# Patient Record
Sex: MALE | Race: BLACK OR AFRICAN AMERICAN | NOT HISPANIC OR LATINO | Employment: FULL TIME | URBAN - METROPOLITAN AREA
[De-identification: names, ages, dates, MRNs, and addresses within clinical notes are randomized per-mention and may not be internally consistent; named-entity substitution may affect disease eponyms.]

---

## 2024-06-20 ENCOUNTER — HOSPITAL ENCOUNTER (EMERGENCY)
Facility: HOSPITAL | Age: 39
Discharge: HOME | End: 2024-06-20
Attending: STUDENT IN AN ORGANIZED HEALTH CARE EDUCATION/TRAINING PROGRAM | Admitting: STUDENT IN AN ORGANIZED HEALTH CARE EDUCATION/TRAINING PROGRAM
Payer: COMMERCIAL

## 2024-06-20 VITALS
OXYGEN SATURATION: 99 % | DIASTOLIC BLOOD PRESSURE: 66 MMHG | HEIGHT: 67 IN | BODY MASS INDEX: 29.51 KG/M2 | TEMPERATURE: 99 F | HEART RATE: 88 BPM | WEIGHT: 188 LBS | RESPIRATION RATE: 16 BRPM | SYSTOLIC BLOOD PRESSURE: 126 MMHG

## 2024-06-20 DIAGNOSIS — U07.1 COVID-19: ICD-10-CM

## 2024-06-20 LAB
FLUAV RNA SPEC QL NAA+PROBE: NEGATIVE
FLUBV RNA RESP QL NAA+PROBE: NEGATIVE
GROUP A STREP BY PCR: NOT DETECTED
RSV RNA SPEC NAA+PROBE: NEGATIVE
SARS-COV-2 RNA RESP QL NAA+PROBE: POSITIVE

## 2024-06-20 PROCEDURE — 250N000012 HC RX MED GY IP 250 OP 636 PS 637: Performed by: STUDENT IN AN ORGANIZED HEALTH CARE EDUCATION/TRAINING PROGRAM

## 2024-06-20 PROCEDURE — 250N000013 HC RX MED GY IP 250 OP 250 PS 637: Performed by: STUDENT IN AN ORGANIZED HEALTH CARE EDUCATION/TRAINING PROGRAM

## 2024-06-20 PROCEDURE — 99283 EMERGENCY DEPT VISIT LOW MDM: CPT

## 2024-06-20 PROCEDURE — 87637 SARSCOV2&INF A&B&RSV AMP PRB: CPT | Performed by: STUDENT IN AN ORGANIZED HEALTH CARE EDUCATION/TRAINING PROGRAM

## 2024-06-20 PROCEDURE — 87651 STREP A DNA AMP PROBE: CPT | Performed by: STUDENT IN AN ORGANIZED HEALTH CARE EDUCATION/TRAINING PROGRAM

## 2024-06-20 RX ORDER — IBUPROFEN 600 MG/1
600 TABLET, FILM COATED ORAL ONCE
Status: COMPLETED | OUTPATIENT
Start: 2024-06-20 | End: 2024-06-20

## 2024-06-20 RX ORDER — ACETAMINOPHEN 325 MG/1
975 TABLET ORAL ONCE
Status: COMPLETED | OUTPATIENT
Start: 2024-06-20 | End: 2024-06-20

## 2024-06-20 RX ADMIN — DEXAMETHASONE INTENSOL 10 MG: 1 SOLUTION, CONCENTRATE ORAL at 01:50

## 2024-06-20 RX ADMIN — ACETAMINOPHEN 975 MG: 325 TABLET ORAL at 01:23

## 2024-06-20 ASSESSMENT — ACTIVITIES OF DAILY LIVING (ADL): ADLS_ACUITY_SCORE: 35

## 2024-06-20 ASSESSMENT — COLUMBIA-SUICIDE SEVERITY RATING SCALE - C-SSRS
1. IN THE PAST MONTH, HAVE YOU WISHED YOU WERE DEAD OR WISHED YOU COULD GO TO SLEEP AND NOT WAKE UP?: NO
6. HAVE YOU EVER DONE ANYTHING, STARTED TO DO ANYTHING, OR PREPARED TO DO ANYTHING TO END YOUR LIFE?: NO
2. HAVE YOU ACTUALLY HAD ANY THOUGHTS OF KILLING YOURSELF IN THE PAST MONTH?: NO

## 2024-06-20 NOTE — ED PROVIDER NOTES
EMERGENCY DEPARTMENT ENCOUNTER      NAME: Nicole Beckham  AGE: 39 year old male  YOB: 1985  MRN: [id number]  EVALUATION DATE & TIME: 6/20/2024  1:16 AM    PCP: [facility]    ED PROVIDER: Javi Mendoza MD      Chief Complaint   Patient presents with    Cough    Pharyngitis         FINAL IMPRESSION:  1. COVID-19          ED COURSE & MEDICAL DECISION MAKING:    Pertinent Labs & Imaging studies reviewed. (See chart for details)  39 year old male presents to the Emergency Department for evaluation of cough, fever, nasal congestion, dry cough    ED Course as of 06/20/24 0241   Thu Jun 20, 2024   0129 Patient is a 39-year-old male who is previously healthy and presents to the emergency department with 3 days of fever, nasal congestion, sore throat, dry cough.  He is well-appearing otherwise, no sinus tenderness or facial swelling.  No crowding of the oropharynx but he does have bilateral tonsillar enlargement without erythema or exudates.  Suspect viral upper respiratory infection, but cannot exclude strep throat.  Will provide Tylenol and Decadron as he has taken Motrin recently.  Will also test for strep, COVID, influenza, RSV.  If these are negative, then will encourage over-the-counter supportive cares and provide return precautions.   0207 Strep negative   0239 Patient is COVID-positive.  He is not high risk, and would not recommend antivirals.  Will discharge at this time with symptomatic cares.       Medical Decision Making  Obtained supplemental history:Supplemental history obtained?: No  Reviewed external records: External records reviewed?: No  Care impacted by chronic illness:N/A  Care significantly affected by social determinants of health:N/A  Did you consider but not order tests?: Work up considered but not performed and documented in chart, if applicable  Did you interpret images independently?: Independent interpretation of ECG and images noted in  "documentation, when applicable.  Consultation discussion with other provider:Did you involve another provider (consultant, , pharmacy, etc.)?: No  Discharge. No recommendations on prescription strength medication(s). See documentation for any additional details.        At the conclusion of the encounter I discussed the results of all of the tests and the disposition. The questions were answered. The patient or family acknowledged understanding and was agreeable with the care plan.     0 minutes of critical care time     MEDICATIONS GIVEN IN THE EMERGENCY:  Medications   acetaminophen (TYLENOL) tablet 975 mg (975 mg Oral $Given 6/20/24 0123)   ibuprofen (ADVIL/MOTRIN) tablet 600 mg (600 mg Oral Not Given 6/20/24 0125)   dexAMETHasone (DECADRON) (HIGH CONC) solution 10 mg (10 mg Oral $Given 6/20/24 0150)       NEW PRESCRIPTIONS STARTED AT TODAY'S ER VISIT  New Prescriptions    No medications on file          =================================================================    HPI    Patient information was obtained from: patient    Use of : N/A        Nicole Beckham is a 39 year old male who presents to this ED for evaluation of fever, dry cough, sore throat, nasal congestion, runny nose.  Patient has been using Tylenol, Motrin.  His symptoms have not been improving since they began 3 days ago, so he came to the ER.  No difficulty breathing, chest pain, neck stiffness or rigidity.  No abdominal pain, nausea/vomiting.  No ear pain.      PAST MEDICAL HISTORY:  No past medical history on file.    PAST SURGICAL HISTORY:  No past surgical history on file.        CURRENT MEDICATIONS:    No current outpatient medications on file.      ALLERGIES:  No Known Allergies    FAMILY HISTORY:  No family history on file.    SOCIAL HISTORY:   Social History     Socioeconomic History    Marital status:        VITALS:  /66   Pulse 88   Temp 99  F (37.2  C) (Oral)   Resp 16   Ht 1.702 m (5' 7\")   Wt 85.3 kg " (188 lb)   SpO2 99%   BMI 29.44 kg/m      PHYSICAL EXAM    Physical Exam  Vitals and nursing note reviewed.   Constitutional:       General: He is not in acute distress.     Appearance: Normal appearance. He is normal weight. He is not ill-appearing.   HENT:      Head: Normocephalic and atraumatic.      Nose: Congestion and rhinorrhea present.      Mouth/Throat:      Mouth: Mucous membranes are moist.      Pharynx: Oropharynx is clear. No oropharyngeal exudate or posterior oropharyngeal erythema.      Comments: 1+ bilateral tonsillar enlargement.  Uvula midline.  Eyes:      Extraocular Movements: Extraocular movements intact.      Conjunctiva/sclera: Conjunctivae normal.      Pupils: Pupils are equal, round, and reactive to light.   Cardiovascular:      Rate and Rhythm: Normal rate and regular rhythm.      Pulses: Normal pulses.      Heart sounds: Normal heart sounds. No murmur heard.  Pulmonary:      Effort: Pulmonary effort is normal. No respiratory distress.      Breath sounds: Normal breath sounds.   Abdominal:      General: Abdomen is flat. There is no distension.      Palpations: Abdomen is soft.      Tenderness: There is no abdominal tenderness.   Musculoskeletal:         General: Normal range of motion.      Cervical back: Normal range of motion and neck supple. No rigidity or tenderness.      Right lower leg: No edema.      Left lower leg: No edema.   Lymphadenopathy:      Cervical: Cervical adenopathy present.   Skin:     General: Skin is warm and dry.      Capillary Refill: Capillary refill takes less than 2 seconds.      Coloration: Skin is not jaundiced or pale.   Neurological:      General: No focal deficit present.      Mental Status: He is alert and oriented to person, place, and time. Mental status is at baseline.   Psychiatric:         Mood and Affect: Mood normal.         Behavior: Behavior normal.         Thought Content: Thought content normal.         Judgment: Judgment normal.             LAB:  All pertinent labs reviewed and interpreted.  Results for orders placed or performed during the hospital encounter of 06/20/24   Symptomatic Influenza A/B, RSV, & SARS-CoV2 PCR (COVID-19) Nose    Specimen: Nose; Swab   Result Value Ref Range    Influenza A PCR Negative Negative    Influenza B PCR Negative Negative    RSV PCR Negative Negative    SARS CoV2 PCR Positive (A) Negative   Group A Streptococcus PCR Throat Swab    Specimen: Throat; Swab   Result Value Ref Range    Group A strep by PCR Not Detected Not Detected       RADIOLOGY:  Reviewed all pertinent imaging. Please see official radiology report.  No orders to display       PROCEDURES:   None      [facility] Documentation:   CMS Diagnoses:                 Javi Mendoza MD  [facility] EMERGENCY DEPARTMENT  [facility] [facility] [id number] [phone]       Javi Mendoza MD  06/20/24 0241

## 2024-06-20 NOTE — DISCHARGE INSTRUCTIONS
Continue to treat your symptoms using Tylenol, ibuprofen, drinking plenty of fluids, and getting rest.  Your symptoms should resolve within 7 days.    Please return to the emergency department if your symptoms worsen, if you develop difficulty breathing.

## 2024-06-20 NOTE — Clinical Note
Nicole Beckham was seen and treated in our emergency department on 6/20/2024.  He may return to work on 06/22/2024.       If you have any questions or concerns, please don't hesitate to call.      Javi Mendoza MD

## 2024-06-20 NOTE — ED TRIAGE NOTES
Pt reports cough, headache, sore throat and chills that started on Sunday. Denies fevers at home. Pt has been taking Ibuprofen and Benadryl and allergy relief medications at home. Pt took a COVID test at home on Tuesday and it was negative.

## 2025-07-01 NOTE — Clinical Note
Nicole Beckham was seen and treated in our emergency department on 6/20/2024.  He may return to work on 06/22/2024.       If you have any questions or concerns, please don't hesitate to call.      Javi Mendoza MD Controlled with current regime